# Patient Record
Sex: MALE
[De-identification: names, ages, dates, MRNs, and addresses within clinical notes are randomized per-mention and may not be internally consistent; named-entity substitution may affect disease eponyms.]

---

## 2019-05-01 ENCOUNTER — APPOINTMENT (OUTPATIENT)
Dept: NEUROSURGERY | Facility: CLINIC | Age: 54
End: 2019-05-01
Payer: MEDICAID

## 2019-05-01 DIAGNOSIS — M54.16 RADICULOPATHY, LUMBAR REGION: ICD-10-CM

## 2019-05-01 DIAGNOSIS — Z78.9 OTHER SPECIFIED HEALTH STATUS: ICD-10-CM

## 2019-05-01 DIAGNOSIS — M51.26 OTHER INTERVERTEBRAL DISC DISPLACEMENT, LUMBAR REGION: ICD-10-CM

## 2019-05-01 PROBLEM — Z00.00 ENCOUNTER FOR PREVENTIVE HEALTH EXAMINATION: Status: ACTIVE | Noted: 2019-05-01

## 2019-05-01 PROCEDURE — 99204 OFFICE O/P NEW MOD 45 MIN: CPT

## 2019-05-02 PROBLEM — Z78.9 NON-SMOKER: Status: ACTIVE | Noted: 2019-05-02

## 2019-05-02 PROBLEM — M54.16 CHRONIC LUMBAR RADICULOPATHY: Status: ACTIVE | Noted: 2019-05-02

## 2019-05-02 PROBLEM — M51.26 LUMBAR DISC HERNIATION: Status: ACTIVE | Noted: 2019-05-02

## 2019-05-02 NOTE — PLAN
[FreeTextEntry1] : I discussed at length with Mr. Mcgarry and his wife surgical intervention via L4-5, L5-S1 microdiscectomy vs. further conservative measures with PETERSON. At this time he would like to attempt an PETERSON. I have referred him to pain management and I will see him back in 4-6 weeks.

## 2019-05-02 NOTE — HISTORY OF PRESENT ILLNESS
[de-identified] : Pt is here for evaluation of more than 1 year of pain radiating from left back to buttock and down back and sides of leg to top of foot. No true weakness, no bowel/bladder dysfunction. He has attempted PT with no relief. No pain management.\par \par MRI of the lumbar spine is significant for left L4-5, L5-S! disc herniations with foraminal and lateral recess compression of L4 and L5 nerves.

## 2019-05-31 ENCOUNTER — APPOINTMENT (OUTPATIENT)
Dept: NEUROSURGERY | Facility: CLINIC | Age: 54
End: 2019-05-31